# Patient Record
Sex: FEMALE | Race: WHITE | NOT HISPANIC OR LATINO | Employment: OTHER | URBAN - NONMETROPOLITAN AREA
[De-identification: names, ages, dates, MRNs, and addresses within clinical notes are randomized per-mention and may not be internally consistent; named-entity substitution may affect disease eponyms.]

---

## 2023-10-06 ENCOUNTER — APPOINTMENT (OUTPATIENT)
Dept: RADIOLOGY | Facility: HOSPITAL | Age: 83
End: 2023-10-06
Payer: MEDICARE

## 2023-10-06 ENCOUNTER — HOSPITAL ENCOUNTER (EMERGENCY)
Facility: HOSPITAL | Age: 83
Discharge: LEFT BEFORE DISCHARGE | End: 2023-10-06
Attending: FAMILY MEDICINE
Payer: MEDICARE

## 2023-10-06 VITALS
WEIGHT: 160 LBS | BODY MASS INDEX: 30.21 KG/M2 | SYSTOLIC BLOOD PRESSURE: 167 MMHG | DIASTOLIC BLOOD PRESSURE: 107 MMHG | OXYGEN SATURATION: 99 % | RESPIRATION RATE: 18 BRPM | HEART RATE: 110 BPM | TEMPERATURE: 98.1 F | HEIGHT: 61 IN

## 2023-10-06 DIAGNOSIS — R41.0 CONFUSION: Primary | ICD-10-CM

## 2023-10-06 LAB
ALBUMIN SERPL BCP-MCNC: 4 G/DL (ref 3.4–5)
ALP SERPL-CCNC: 71 U/L (ref 33–136)
ALT SERPL W P-5'-P-CCNC: 8 U/L (ref 7–45)
ANION GAP SERPL CALC-SCNC: 14 MMOL/L (ref 10–20)
APPEARANCE UR: ABNORMAL
AST SERPL W P-5'-P-CCNC: 13 U/L (ref 9–39)
BACTERIA #/AREA URNS AUTO: ABNORMAL /HPF
BASOPHILS # BLD AUTO: 0.02 X10*3/UL (ref 0–0.1)
BASOPHILS NFR BLD AUTO: 0.2 %
BILIRUB SERPL-MCNC: 0.3 MG/DL (ref 0–1.2)
BILIRUB UR STRIP.AUTO-MCNC: NEGATIVE MG/DL
BUN SERPL-MCNC: 14 MG/DL (ref 6–23)
CALCIUM SERPL-MCNC: 9.4 MG/DL (ref 8.6–10.3)
CARDIAC TROPONIN I PNL SERPL HS: 9 NG/L (ref 0–13)
CHLORIDE SERPL-SCNC: 101 MMOL/L (ref 98–107)
CO2 SERPL-SCNC: 26 MMOL/L (ref 21–32)
COLOR UR: YELLOW
CREAT SERPL-MCNC: 1.33 MG/DL (ref 0.5–1.05)
EOSINOPHIL # BLD AUTO: 0.04 X10*3/UL (ref 0–0.4)
EOSINOPHIL NFR BLD AUTO: 0.5 %
ERYTHROCYTE [DISTWIDTH] IN BLOOD BY AUTOMATED COUNT: 15.2 % (ref 11.5–14.5)
GFR SERPL CREATININE-BSD FRML MDRD: 39 ML/MIN/1.73M*2
GLUCOSE SERPL-MCNC: 116 MG/DL (ref 74–99)
GLUCOSE UR STRIP.AUTO-MCNC: NEGATIVE MG/DL
HCT VFR BLD AUTO: 32.6 % (ref 36–46)
HGB BLD-MCNC: 9.9 G/DL (ref 12–16)
IMM GRANULOCYTES # BLD AUTO: 0.02 X10*3/UL (ref 0–0.5)
IMM GRANULOCYTES NFR BLD AUTO: 0.2 % (ref 0–0.9)
KETONES UR STRIP.AUTO-MCNC: NEGATIVE MG/DL
LEUKOCYTE ESTERASE UR QL STRIP.AUTO: ABNORMAL
LIPASE SERPL-CCNC: 49 U/L (ref 9–82)
LYMPHOCYTES # BLD AUTO: 2.15 X10*3/UL (ref 0.8–3)
LYMPHOCYTES NFR BLD AUTO: 26 %
MAGNESIUM SERPL-MCNC: 2.17 MG/DL (ref 1.6–2.4)
MCH RBC QN AUTO: 26 PG (ref 26–34)
MCHC RBC AUTO-ENTMCNC: 30.4 G/DL (ref 32–36)
MCV RBC AUTO: 86 FL (ref 80–100)
MONOCYTES # BLD AUTO: 0.47 X10*3/UL (ref 0.05–0.8)
MONOCYTES NFR BLD AUTO: 5.7 %
NEUTROPHILS # BLD AUTO: 5.56 X10*3/UL (ref 1.6–5.5)
NEUTROPHILS NFR BLD AUTO: 67.4 %
NITRITE UR QL STRIP.AUTO: NEGATIVE
NRBC BLD-RTO: 0 /100 WBCS (ref 0–0)
PH UR STRIP.AUTO: 7 [PH]
PHOSPHATE SERPL-MCNC: 3.4 MG/DL (ref 2.5–4.9)
PLATELET # BLD AUTO: 279 X10*3/UL (ref 150–450)
PMV BLD AUTO: 9.5 FL (ref 7.5–11.5)
POTASSIUM SERPL-SCNC: 3.9 MMOL/L (ref 3.5–5.3)
PROT SERPL-MCNC: 8.5 G/DL (ref 6.4–8.2)
PROT UR STRIP.AUTO-MCNC: ABNORMAL MG/DL
RBC # BLD AUTO: 3.81 X10*6/UL (ref 4–5.2)
RBC # UR STRIP.AUTO: ABNORMAL /UL
RBC #/AREA URNS AUTO: ABNORMAL /HPF
SODIUM SERPL-SCNC: 137 MMOL/L (ref 136–145)
SP GR UR STRIP.AUTO: 1.01
SQUAMOUS #/AREA URNS AUTO: ABNORMAL /HPF
UROBILINOGEN UR STRIP.AUTO-MCNC: <2 MG/DL
WBC # BLD AUTO: 8.3 X10*3/UL (ref 4.4–11.3)
WBC #/AREA URNS AUTO: >50 /HPF
WBC CLUMPS #/AREA URNS AUTO: ABNORMAL /HPF

## 2023-10-06 PROCEDURE — 71045 X-RAY EXAM CHEST 1 VIEW: CPT | Mod: FY

## 2023-10-06 PROCEDURE — 36415 COLL VENOUS BLD VENIPUNCTURE: CPT | Performed by: FAMILY MEDICINE

## 2023-10-06 PROCEDURE — 81001 URINALYSIS AUTO W/SCOPE: CPT | Performed by: FAMILY MEDICINE

## 2023-10-06 PROCEDURE — 82565 ASSAY OF CREATININE: CPT | Performed by: FAMILY MEDICINE

## 2023-10-06 PROCEDURE — 99283 EMERGENCY DEPT VISIT LOW MDM: CPT | Mod: 25

## 2023-10-06 PROCEDURE — 83735 ASSAY OF MAGNESIUM: CPT | Performed by: FAMILY MEDICINE

## 2023-10-06 PROCEDURE — 71045 X-RAY EXAM CHEST 1 VIEW: CPT | Performed by: RADIOLOGY

## 2023-10-06 PROCEDURE — 99284 EMERGENCY DEPT VISIT MOD MDM: CPT | Performed by: FAMILY MEDICINE

## 2023-10-06 PROCEDURE — 83690 ASSAY OF LIPASE: CPT | Performed by: FAMILY MEDICINE

## 2023-10-06 PROCEDURE — 85025 COMPLETE CBC W/AUTO DIFF WBC: CPT | Performed by: FAMILY MEDICINE

## 2023-10-06 PROCEDURE — 84100 ASSAY OF PHOSPHORUS: CPT | Performed by: FAMILY MEDICINE

## 2023-10-06 PROCEDURE — 84484 ASSAY OF TROPONIN QUANT: CPT | Performed by: FAMILY MEDICINE

## 2023-10-06 ASSESSMENT — PAIN SCALES - GENERAL: PAINLEVEL_OUTOF10: 0 - NO PAIN

## 2023-10-06 ASSESSMENT — PAIN - FUNCTIONAL ASSESSMENT: PAIN_FUNCTIONAL_ASSESSMENT: 0-10

## 2023-10-06 ASSESSMENT — COLUMBIA-SUICIDE SEVERITY RATING SCALE - C-SSRS
6. HAVE YOU EVER DONE ANYTHING, STARTED TO DO ANYTHING, OR PREPARED TO DO ANYTHING TO END YOUR LIFE?: NO
2. HAVE YOU ACTUALLY HAD ANY THOUGHTS OF KILLING YOURSELF?: NO
1. IN THE PAST MONTH, HAVE YOU WISHED YOU WERE DEAD OR WISHED YOU COULD GO TO SLEEP AND NOT WAKE UP?: NO

## 2023-10-06 NOTE — ED PROVIDER NOTES
HPI   Chief Complaint   Patient presents with    Urinary Frequency     Pt has had increased incontinence        HPI 83-year-old female patient who is required to be hospice patient sent to the ER for hospice care as patient was confused and having further altered mentation status and family was concerned about change in mental status.  They do not want any invasive procedures or CT images on make sure patient does not have any infection that could be treatment.  There was no reported vomiting or diarrhea or complaint of chest pain or short of breath patient herself has dementia could only answer simple questions related to her present condition.  She follow commands and stated she can move his leg.  Patient however could not remember what she ate for lunch with details.  Patient  states she only ate 5 French fries.  However there was no complaint of nausea vomiting or diarrhea or blood in stool or black stool or any abdominal pain chest pain or short of breath loss of conscious blackout or pass out or trauma fall injury.     Family history: Unobtainable  Social history: She lives with her .  Review of system: Only limited review of system could be obtained as described in HPI otherwise unremarkable/ unobtainable             No data recorded                Patient History   No past medical history on file.  No past surgical history on file.  No family history on file.  Social History     Tobacco Use    Smoking status: Not on file    Smokeless tobacco: Not on file   Substance Use Topics    Alcohol use: Not on file    Drug use: Not on file       Physical Exam   ED Triage Vitals [10/06/23 1757]   Temp Heart Rate Resp BP   36.7 °C (98.1 °F) 110 18 (!) 167/107      SpO2 Temp Source Heart Rate Source Patient Position   99 % Oral Monitor Sitting      BP Location FiO2 (%)     Left arm --       Physical Exam    CONSTITUTIONAL: Female patient who was able to talk and follow simple commands but could not tell me  about her medical history.  No facial bruise edema erythema no facial drooping or drooling.  I could move her arms or legs.  Limited examination.    HENMT: The airway was intact. There was no ear or nose discharge. No drooling or stridor. Neck was supple. Talking and breathing comfortably.      EYES: Clear bilaterally, pupils equal, round and reactive to light.     CARDIOVASCULAR: Chest wall nontenderChest wall is nontender.Regular rate and rhythm. No friction rub or murmur good peripheral pulses no peripheral edema. Nontender Homans sign negative.     RESPIRATORY: No tenderness upon palpation of chest wall.. Patient was breathing comfortably. No tachypnea no respiratory distress.  On auscultation he has diminished breath sounds crackles in the lung bases.  However has good skin perfusion.     GASTROINTESTINAL: Abdomen soft positive bowel sounds, no tenderness no guarding, rebound surgery no CVA tenderness, gentle palpation revealed no pulsatile mass.    GENITOURINARY:  No costovertebral angle tenderness. MUSCULOSKELETAL: Head was normocephalic atraumatic cervical thoracic lumbar spine nontender.  Chest was nontender  Both upper extremity good motion nontender intact distal pulse intact sensation.     NEUROLOGICAL: Patient was awake only speaking few words no facial drooping or drooling no stridor does not follow full commands I was able to move her arms leg neck is supple head was normocephalic atraumatic.  Intact sensation grossly.  Limited examination.  No nystagmus.      SKIN: Skin normal color for race, warm, dry and intact. No evidence of trauma or lesions. PSYCHIATRIC: Limited examination.  No facial drooping or drooling no stridor.  No expression of suicidal thought or homicidal thought   CRITICAL CARE    VITAL SIGNS:   Triage blood pressure 167/107 heart rate was 110 respirate 18 pulse ox 99% temperature 98.1           DISPOSITION      ED Course & MDM   Diagnoses as of 12/16/23 0812   Confusion     I  evaluated the patient initial workup was started and patient was signed out to Dr. Wiggins for further evaluation and final disposition.  Workup in progress patient hemodynamic stable at the time of signout       Humberto Rider MD  12/16/23 0880

## 2023-10-07 NOTE — ED PROVIDER NOTES
HPI   Chief Complaint   Patient presents with    Urinary Frequency     Pt has had increased incontinence        HPI                    Romain Coma Scale Score: 15                  Patient History   No past medical history on file.  No past surgical history on file.  No family history on file.  Social History     Tobacco Use    Smoking status: Not on file    Smokeless tobacco: Not on file   Substance Use Topics    Alcohol use: Not on file    Drug use: Not on file       Physical Exam   ED Triage Vitals [10/06/23 1757]   Temp Heart Rate Resp BP   36.7 °C (98.1 °F) 110 18 (!) 167/107      SpO2 Temp Source Heart Rate Source Patient Position   99 % Oral Monitor Sitting      BP Location FiO2 (%)     Left arm --       Physical Exam    ED Course & MDM   Diagnoses as of 10/06/23 2005   Confusion       Medical Decision Making  Patient was signed out to me in stable condition.  The working problem was that she is a hospice patient who had some confusion or changes and family wanted her to be checked to see if there were a reversible cause.  If we found a reversible cause then we can treat that either at home or in the hospital.  Then the family said that they had spoken with the hospice nurse and she felt the patient should be evaluated by psychiatry and admitted for couple days since she voiced some desire to harm herself earlier in the day.  This is all new to us and Dr. Talbot spoke with the family and they agree they were not going to stay any longer for EPAT consultation so they have agreed to sign AGAINST MEDICAL ADVICE.  Patient will follow-up with the hospice nurse and be brought back as needed.        Procedure  Procedures     Andrey Wiggins MD  10/06/23 2008

## 2023-12-05 ENCOUNTER — HOSPITAL ENCOUNTER (OUTPATIENT)
Dept: CARDIOLOGY | Facility: HOSPITAL | Age: 83
Discharge: HOME | End: 2023-12-05

## 2023-12-05 PROCEDURE — 93005 ELECTROCARDIOGRAM TRACING: CPT

## 2023-12-06 LAB
ATRIAL RATE: 127 BPM
Q ONSET: 227 MS
QRS COUNT: 19 BEATS
QRS DURATION: 86 MS
QT INTERVAL: 346 MS
QTC CALCULATION(BAZETT): 476 MS
QTC FREDERICIA: 428 MS
R AXIS: -31 DEGREES
T AXIS: 114 DEGREES
T OFFSET: 400 MS
VENTRICULAR RATE: 114 BPM